# Patient Record
Sex: FEMALE | Race: WHITE | Employment: FULL TIME | URBAN - METROPOLITAN AREA
[De-identification: names, ages, dates, MRNs, and addresses within clinical notes are randomized per-mention and may not be internally consistent; named-entity substitution may affect disease eponyms.]

---

## 2019-04-09 LAB
ABO + RH BLD: NORMAL
ABO + RH BLD: NORMAL
BLD GP AB SCN SERPL QL: NORMAL
HBV SURFACE AG SERPL QL IA: NORMAL
HIV 1+2 AB+HIV1 P24 AG SERPL QL IA: NORMAL
RUBELLA ABY IGG: NORMAL
TREPONEMA ANTIBODIES: NORMAL

## 2019-10-23 LAB — GROUP B STREP PCR: POSITIVE

## 2019-11-10 ENCOUNTER — ANESTHESIA EVENT (OUTPATIENT)
Dept: OBGYN | Facility: CLINIC | Age: 39
End: 2019-11-10
Payer: COMMERCIAL

## 2019-11-10 ENCOUNTER — ANESTHESIA (OUTPATIENT)
Dept: OBGYN | Facility: CLINIC | Age: 39
End: 2019-11-10
Payer: COMMERCIAL

## 2019-11-10 ENCOUNTER — HOSPITAL ENCOUNTER (INPATIENT)
Facility: CLINIC | Age: 39
LOS: 2 days | Discharge: HOME | End: 2019-11-12
Attending: SPECIALIST | Admitting: SPECIALIST
Payer: COMMERCIAL

## 2019-11-10 PROBLEM — O99.820 GROUP B STREPTOCOCCAL CARRIAGE COMPLICATING PREGNANCY: Status: ACTIVE | Noted: 2019-11-10

## 2019-11-10 PROBLEM — E04.1 RIGHT THYROID NODULE: Status: ACTIVE | Noted: 2018-03-06

## 2019-11-10 PROBLEM — O09.529 AMA (ADVANCED MATERNAL AGE) MULTIGRAVIDA 35+, UNSPECIFIED TRIMESTER: Status: ACTIVE | Noted: 2019-11-10

## 2019-11-10 PROBLEM — F41.9 ANXIETY: Status: ACTIVE | Noted: 2017-02-07

## 2019-11-10 PROBLEM — Z36.89 ENCOUNTER FOR TRIAGE IN PREGNANT PATIENT: Status: ACTIVE | Noted: 2019-11-10

## 2019-11-10 PROBLEM — B00.9 RECURRENT HSV (HERPES SIMPLEX VIRUS): Status: ACTIVE | Noted: 2017-02-07

## 2019-11-10 LAB
ABO + RH BLD: NORMAL
ABO + RH BLD: NORMAL
BASOPHILS # BLD AUTO: 0 10E9/L (ref 0–0.2)
BASOPHILS NFR BLD AUTO: 0.2 %
BLD GP AB SCN SERPL QL: NORMAL
BLOOD BANK CMNT PATIENT-IMP: NORMAL
DIFFERENTIAL METHOD BLD: ABNORMAL
EOSINOPHIL # BLD AUTO: 0.1 10E9/L (ref 0–0.7)
EOSINOPHIL NFR BLD AUTO: 0.7 %
ERYTHROCYTE [DISTWIDTH] IN BLOOD BY AUTOMATED COUNT: 13.1 % (ref 10–15)
HCT VFR BLD AUTO: 37.4 % (ref 35–47)
HGB BLD-MCNC: 13.4 G/DL (ref 11.7–15.7)
IMM GRANULOCYTES # BLD: 0.2 10E9/L (ref 0–0.4)
IMM GRANULOCYTES NFR BLD: 1.3 %
LYMPHOCYTES # BLD AUTO: 2.4 10E9/L (ref 0.8–5.3)
LYMPHOCYTES NFR BLD AUTO: 18 %
MCH RBC QN AUTO: 32.2 PG (ref 26.5–33)
MCHC RBC AUTO-ENTMCNC: 35.8 G/DL (ref 31.5–36.5)
MCV RBC AUTO: 90 FL (ref 78–100)
MONOCYTES # BLD AUTO: 1 10E9/L (ref 0–1.3)
MONOCYTES NFR BLD AUTO: 7.8 %
NEUTROPHILS # BLD AUTO: 9.6 10E9/L (ref 1.6–8.3)
NEUTROPHILS NFR BLD AUTO: 72 %
NRBC # BLD AUTO: 0 10*3/UL
NRBC BLD AUTO-RTO: 0 /100
PLATELET # BLD AUTO: 232 10E9/L (ref 150–450)
RBC # BLD AUTO: 4.16 10E12/L (ref 3.8–5.2)
SPECIMEN EXP DATE BLD: NORMAL
WBC # BLD AUTO: 13.4 10E9/L (ref 4–11)

## 2019-11-10 PROCEDURE — 85025 COMPLETE CBC W/AUTO DIFF WBC: CPT | Performed by: SPECIALIST

## 2019-11-10 PROCEDURE — 36415 COLL VENOUS BLD VENIPUNCTURE: CPT | Performed by: SPECIALIST

## 2019-11-10 PROCEDURE — 12000000 ZZH R&B MED SURG/OB

## 2019-11-10 PROCEDURE — 86780 TREPONEMA PALLIDUM: CPT | Performed by: SPECIALIST

## 2019-11-10 PROCEDURE — 25000128 H RX IP 250 OP 636: Performed by: SPECIALIST

## 2019-11-10 PROCEDURE — 59025 FETAL NON-STRESS TEST: CPT

## 2019-11-10 PROCEDURE — 25000125 ZZHC RX 250: Performed by: ANESTHESIOLOGY

## 2019-11-10 PROCEDURE — 86900 BLOOD TYPING SEROLOGIC ABO: CPT | Performed by: SPECIALIST

## 2019-11-10 PROCEDURE — 25000128 H RX IP 250 OP 636: Performed by: ANESTHESIOLOGY

## 2019-11-10 PROCEDURE — 25800030 ZZH RX IP 258 OP 636: Performed by: SPECIALIST

## 2019-11-10 PROCEDURE — G0463 HOSPITAL OUTPT CLINIC VISIT: HCPCS | Mod: 25

## 2019-11-10 PROCEDURE — 86901 BLOOD TYPING SEROLOGIC RH(D): CPT | Performed by: SPECIALIST

## 2019-11-10 PROCEDURE — 37000011 ZZH ANESTHESIA WARD SERVICE

## 2019-11-10 PROCEDURE — 86850 RBC ANTIBODY SCREEN: CPT | Performed by: SPECIALIST

## 2019-11-10 PROCEDURE — 25800030 ZZH RX IP 258 OP 636: Performed by: ANESTHESIOLOGY

## 2019-11-10 RX ORDER — FENTANYL/BUPIVACAINE/NS/PF 2-1250MCG
14 PLASTIC BAG, INJECTION (ML) INJECTION CONTINUOUS
Status: DISCONTINUED | OUTPATIENT
Start: 2019-11-10 | End: 2019-11-11

## 2019-11-10 RX ORDER — CARBOPROST TROMETHAMINE 250 UG/ML
250 INJECTION, SOLUTION INTRAMUSCULAR
Status: DISCONTINUED | OUTPATIENT
Start: 2019-11-10 | End: 2019-11-12 | Stop reason: HOSPADM

## 2019-11-10 RX ORDER — OXYTOCIN 10 [USP'U]/ML
10 INJECTION, SOLUTION INTRAMUSCULAR; INTRAVENOUS
Status: DISCONTINUED | OUTPATIENT
Start: 2019-11-10 | End: 2019-11-11

## 2019-11-10 RX ORDER — VALACYCLOVIR HYDROCHLORIDE 500 MG/1
1000 TABLET, FILM COATED ORAL DAILY
Status: ON HOLD | COMMUNITY
End: 2019-11-12

## 2019-11-10 RX ORDER — NALOXONE HYDROCHLORIDE 0.4 MG/ML
.1-.4 INJECTION, SOLUTION INTRAMUSCULAR; INTRAVENOUS; SUBCUTANEOUS
Status: DISCONTINUED | OUTPATIENT
Start: 2019-11-10 | End: 2019-11-11

## 2019-11-10 RX ORDER — FENTANYL CITRATE 50 UG/ML
50-100 INJECTION, SOLUTION INTRAMUSCULAR; INTRAVENOUS ONCE
Status: COMPLETED | OUTPATIENT
Start: 2019-11-10 | End: 2019-11-10

## 2019-11-10 RX ORDER — FENTANYL CITRATE 50 UG/ML
50-100 INJECTION, SOLUTION INTRAMUSCULAR; INTRAVENOUS
Status: DISCONTINUED | OUTPATIENT
Start: 2019-11-10 | End: 2019-11-10

## 2019-11-10 RX ORDER — NALBUPHINE HYDROCHLORIDE 10 MG/ML
2.5-5 INJECTION, SOLUTION INTRAMUSCULAR; INTRAVENOUS; SUBCUTANEOUS EVERY 6 HOURS PRN
Status: DISCONTINUED | OUTPATIENT
Start: 2019-11-10 | End: 2019-11-11

## 2019-11-10 RX ORDER — PENICILLIN G POTASSIUM 5000000 [IU]/1
5 INJECTION, POWDER, FOR SOLUTION INTRAMUSCULAR; INTRAVENOUS ONCE
Status: COMPLETED | OUTPATIENT
Start: 2019-11-10 | End: 2019-11-10

## 2019-11-10 RX ORDER — DOCUSATE SODIUM 100 MG/1
100 CAPSULE, LIQUID FILLED ORAL 2 TIMES DAILY
COMMUNITY

## 2019-11-10 RX ORDER — EPHEDRINE SULFATE 50 MG/ML
5 INJECTION, SOLUTION INTRAMUSCULAR; INTRAVENOUS; SUBCUTANEOUS
Status: DISCONTINUED | OUTPATIENT
Start: 2019-11-10 | End: 2019-11-11

## 2019-11-10 RX ORDER — ONDANSETRON 2 MG/ML
4 INJECTION INTRAMUSCULAR; INTRAVENOUS EVERY 6 HOURS PRN
Status: DISCONTINUED | OUTPATIENT
Start: 2019-11-10 | End: 2019-11-11

## 2019-11-10 RX ORDER — SODIUM CHLORIDE, SODIUM LACTATE, POTASSIUM CHLORIDE, CALCIUM CHLORIDE 600; 310; 30; 20 MG/100ML; MG/100ML; MG/100ML; MG/100ML
INJECTION, SOLUTION INTRAVENOUS CONTINUOUS
Status: DISCONTINUED | OUTPATIENT
Start: 2019-11-10 | End: 2019-11-11

## 2019-11-10 RX ORDER — OXYCODONE AND ACETAMINOPHEN 5; 325 MG/1; MG/1
1 TABLET ORAL
Status: DISCONTINUED | OUTPATIENT
Start: 2019-11-10 | End: 2019-11-12 | Stop reason: HOSPADM

## 2019-11-10 RX ORDER — METHYLERGONOVINE MALEATE 0.2 MG/ML
200 INJECTION INTRAVENOUS
Status: DISCONTINUED | OUTPATIENT
Start: 2019-11-10 | End: 2019-11-11

## 2019-11-10 RX ORDER — OXYTOCIN/0.9 % SODIUM CHLORIDE 30/500 ML
100-340 PLASTIC BAG, INJECTION (ML) INTRAVENOUS CONTINUOUS PRN
Status: COMPLETED | OUTPATIENT
Start: 2019-11-10 | End: 2019-11-11

## 2019-11-10 RX ORDER — IBUPROFEN 400 MG/1
800 TABLET, FILM COATED ORAL
Status: DISCONTINUED | OUTPATIENT
Start: 2019-11-10 | End: 2019-11-11

## 2019-11-10 RX ORDER — LIDOCAINE HYDROCHLORIDE AND EPINEPHRINE 15; 5 MG/ML; UG/ML
INJECTION, SOLUTION EPIDURAL PRN
Status: DISCONTINUED | OUTPATIENT
Start: 2019-11-10 | End: 2019-11-11

## 2019-11-10 RX ORDER — ACETAMINOPHEN 325 MG/1
650 TABLET ORAL EVERY 4 HOURS PRN
Status: DISCONTINUED | OUTPATIENT
Start: 2019-11-10 | End: 2019-11-11

## 2019-11-10 RX ORDER — POLYETHYLENE GLYCOL 3350 17 G/17G
1 POWDER, FOR SOLUTION ORAL DAILY
COMMUNITY

## 2019-11-10 RX ADMIN — LIDOCAINE HYDROCHLORIDE AND EPINEPHRINE 2 ML: 15; 5 INJECTION, SOLUTION EPIDURAL at 20:32

## 2019-11-10 RX ADMIN — SODIUM CHLORIDE, POTASSIUM CHLORIDE, SODIUM LACTATE AND CALCIUM CHLORIDE: 600; 310; 30; 20 INJECTION, SOLUTION INTRAVENOUS at 18:52

## 2019-11-10 RX ADMIN — SODIUM CHLORIDE 2.5 MILLION UNITS: 9 INJECTION, SOLUTION INTRAVENOUS at 22:31

## 2019-11-10 RX ADMIN — SODIUM CHLORIDE, POTASSIUM CHLORIDE, SODIUM LACTATE AND CALCIUM CHLORIDE: 600; 310; 30; 20 INJECTION, SOLUTION INTRAVENOUS at 21:28

## 2019-11-10 RX ADMIN — LIDOCAINE HYDROCHLORIDE AND EPINEPHRINE 3 ML: 15; 5 INJECTION, SOLUTION EPIDURAL at 20:31

## 2019-11-10 RX ADMIN — BUPIVACAINE HYDROCHLORIDE 12 ML/HR: 5 INJECTION, SOLUTION EPIDURAL; INTRACAUDAL at 20:36

## 2019-11-10 RX ADMIN — PENICILLIN G POTASSIUM 5 MILLION UNITS: 5000000 POWDER, FOR SOLUTION INTRAMUSCULAR; INTRAPLEURAL; INTRATHECAL; INTRAVENOUS at 18:52

## 2019-11-10 RX ADMIN — Medication 5 MG: at 20:19

## 2019-11-10 RX ADMIN — FENTANYL CITRATE 100 MCG: 50 INJECTION, SOLUTION INTRAMUSCULAR; INTRAVENOUS at 20:14

## 2019-11-10 ASSESSMENT — MIFFLIN-ST. JEOR: SCORE: 1727.76

## 2019-11-11 LAB — T PALLIDUM AB SER QL: NONREACTIVE

## 2019-11-11 PROCEDURE — 3E0R3BZ INTRODUCTION OF ANESTHETIC AGENT INTO SPINAL CANAL, PERCUTANEOUS APPROACH: ICD-10-PCS | Performed by: SPECIALIST

## 2019-11-11 PROCEDURE — 25000132 ZZH RX MED GY IP 250 OP 250 PS 637: Performed by: SPECIALIST

## 2019-11-11 PROCEDURE — 12000035 ZZH R&B POSTPARTUM

## 2019-11-11 PROCEDURE — 25000125 ZZHC RX 250: Performed by: SPECIALIST

## 2019-11-11 PROCEDURE — 00HU33Z INSERTION OF INFUSION DEVICE INTO SPINAL CANAL, PERCUTANEOUS APPROACH: ICD-10-PCS | Performed by: SPECIALIST

## 2019-11-11 PROCEDURE — 72200001 ZZH LABOR CARE VAGINAL DELIVERY SINGLE

## 2019-11-11 PROCEDURE — 10907ZC DRAINAGE OF AMNIOTIC FLUID, THERAPEUTIC FROM PRODUCTS OF CONCEPTION, VIA NATURAL OR ARTIFICIAL OPENING: ICD-10-PCS | Performed by: SPECIALIST

## 2019-11-11 RX ORDER — BISACODYL 10 MG
10 SUPPOSITORY, RECTAL RECTAL DAILY PRN
Status: DISCONTINUED | OUTPATIENT
Start: 2019-11-13 | End: 2019-11-12 | Stop reason: HOSPADM

## 2019-11-11 RX ORDER — HYDROCORTISONE 2.5 %
CREAM (GRAM) TOPICAL 3 TIMES DAILY PRN
Status: DISCONTINUED | OUTPATIENT
Start: 2019-11-11 | End: 2019-11-12 | Stop reason: HOSPADM

## 2019-11-11 RX ORDER — OXYTOCIN/0.9 % SODIUM CHLORIDE 30/500 ML
340 PLASTIC BAG, INJECTION (ML) INTRAVENOUS CONTINUOUS PRN
Status: DISCONTINUED | OUTPATIENT
Start: 2019-11-11 | End: 2019-11-12 | Stop reason: HOSPADM

## 2019-11-11 RX ORDER — AMOXICILLIN 250 MG
2 CAPSULE ORAL 2 TIMES DAILY
Status: DISCONTINUED | OUTPATIENT
Start: 2019-11-11 | End: 2019-11-12 | Stop reason: HOSPADM

## 2019-11-11 RX ORDER — IBUPROFEN 600 MG/1
600 TABLET, FILM COATED ORAL EVERY 6 HOURS PRN
Status: DISCONTINUED | OUTPATIENT
Start: 2019-11-11 | End: 2019-11-12 | Stop reason: HOSPADM

## 2019-11-11 RX ORDER — NALOXONE HYDROCHLORIDE 0.4 MG/ML
.1-.4 INJECTION, SOLUTION INTRAMUSCULAR; INTRAVENOUS; SUBCUTANEOUS
Status: DISCONTINUED | OUTPATIENT
Start: 2019-11-11 | End: 2019-11-12 | Stop reason: HOSPADM

## 2019-11-11 RX ORDER — ACETAMINOPHEN 325 MG/1
650 TABLET ORAL EVERY 4 HOURS PRN
Status: DISCONTINUED | OUTPATIENT
Start: 2019-11-11 | End: 2019-11-12 | Stop reason: HOSPADM

## 2019-11-11 RX ORDER — OXYTOCIN 10 [USP'U]/ML
10 INJECTION, SOLUTION INTRAMUSCULAR; INTRAVENOUS
Status: DISCONTINUED | OUTPATIENT
Start: 2019-11-11 | End: 2019-11-12 | Stop reason: HOSPADM

## 2019-11-11 RX ORDER — MISOPROSTOL 200 UG/1
800 TABLET ORAL
Status: DISCONTINUED | OUTPATIENT
Start: 2019-11-11 | End: 2019-11-12 | Stop reason: HOSPADM

## 2019-11-11 RX ORDER — LANOLIN 100 %
OINTMENT (GRAM) TOPICAL
Status: DISCONTINUED | OUTPATIENT
Start: 2019-11-11 | End: 2019-11-12 | Stop reason: HOSPADM

## 2019-11-11 RX ORDER — OXYTOCIN/0.9 % SODIUM CHLORIDE 30/500 ML
100 PLASTIC BAG, INJECTION (ML) INTRAVENOUS CONTINUOUS
Status: DISCONTINUED | OUTPATIENT
Start: 2019-11-11 | End: 2019-11-12 | Stop reason: HOSPADM

## 2019-11-11 RX ORDER — AMOXICILLIN 250 MG
1 CAPSULE ORAL 2 TIMES DAILY
Status: DISCONTINUED | OUTPATIENT
Start: 2019-11-11 | End: 2019-11-12 | Stop reason: HOSPADM

## 2019-11-11 RX ADMIN — Medication 100 ML/HR: at 02:09

## 2019-11-11 RX ADMIN — IBUPROFEN 600 MG: 600 TABLET ORAL at 14:30

## 2019-11-11 RX ADMIN — ACETAMINOPHEN 650 MG: 325 TABLET, FILM COATED ORAL at 14:30

## 2019-11-11 RX ADMIN — IBUPROFEN 600 MG: 600 TABLET ORAL at 08:25

## 2019-11-11 RX ADMIN — SENNOSIDES AND DOCUSATE SODIUM 1 TABLET: 8.6; 5 TABLET ORAL at 20:30

## 2019-11-11 RX ADMIN — Medication 340 ML/HR: at 01:37

## 2019-11-11 RX ADMIN — ACETAMINOPHEN 650 MG: 325 TABLET, FILM COATED ORAL at 20:30

## 2019-11-11 RX ADMIN — IBUPROFEN 600 MG: 600 TABLET ORAL at 20:30

## 2019-11-11 RX ADMIN — SENNOSIDES AND DOCUSATE SODIUM 1 TABLET: 8.6; 5 TABLET ORAL at 08:05

## 2019-11-11 NOTE — PLAN OF CARE
Patient arrived to room 422 at 0400 via wheel chair with baby in arms. Report received from Shari Ramirez RN. ID bands verified with RN. Patient and family oriented to room. Safety education reviwed and demonstrated. Call light within reach.

## 2019-11-11 NOTE — PLAN OF CARE
Vital signs stable. Fundus firm at Patient voiding without difficulty. Able to ambulate in room free of dizziness. Tylenol/ibuprofen offered for pain management but declined. Breastfeeding infant every 2-3 hours, latch verified. Encouraged to call with questions/concerns. Will continue to monitor.

## 2019-11-11 NOTE — PROGRESS NOTES
1915: Report from Michelle Rn and care assumed. At bedside with Dr. Baker to get H&P and discuss labor plan. Plan to give pt something for anxiety and get epidural.  1945: Anesthesia  paged to come and discuss with patient before any meds administered. Pt up and moving in room so loss of capture or maternal heart rate traced. RN at bedside since 1915 readjusting and listening.   1951: Dr. Mcmillan at bedside Epidural discussed and pt consented to placement. Pt very anxious about needles and whole epidural process. Plan to administer  mcgs during epidural placement per Dr. Mcmillan assessment of patient anxiety at bedside.  2005: Pt sat up for epidural. Loss of fetal heart rate tracing while pt sitting for epidural monitor adjusted intermittently and fetal heart tones audible in 150's. Total of 100 mcgs of fentanyl given and one dose of ephedrine given per verbal order of Dr. Mcmillan d/t pt feeling light headed after fentanyl administration.  2037: Pt laid back down after epidural placed. Vitals stale and Fetal heart tones category 1.   2131: Dr. Baker updated on pt status.   2220: Dr. Baker at bedside to assess cervix of 6,100,-2 and the AROM clear fluid then second dose of PNC started  Pt continued to labor. Rotated from side to side lying with peanut ball.   2356: Straight cathed for 800ml  0000: 7-8 cm, 100, 0 Fetal head OP in pelvis and pt stating feeling some super pubic discomfort. Put pt in hands and knees position and with pt consent to lisa spinning babies move. Then pt moved to left side lying posterior extension. 0037: Anesthesia called d/t pt discomfort with contractions.   0042: Dose changed to 14 and bolus given by anesthesia. And position changed to semi fowlers with a right tilt and feet of bed lowered.   0100: placed pt in frog legs position with peanut ball in place. 0115: Pt cervix assessed d/t pt feeling very uncomfortable. Pt complete plus 2. Practice push with next contraction moved baby  well. Stopped pushing immediately. Dr. Baker called to attend delivery.  0120: Olivia TALBOT at bedside.   0129: Started pushing  0133: Viable male infant born at 0133 and brought skin to skin with mother. Nuchal cord and true knot present. Delayed cord clamping preformed for 2 minutes.   0345:l Pt up to bathroom and voided.    Data: Katlyn Valle transferred to [facility] via wheelchair at 0355. Baby transferred via parent's arms.  Action: Receiving unit notified of transfer: Yes. Patient and family notified of room change. Report given to Yelena ADKINS RN at 0405. Belongings sent to receiving unit. Accompanied by Registered Nurse. Oriented patient to surroundings. Call light within reach. ID bands double-checked with receiving RN.  Response: Patient tolerated transfer and is stable.

## 2019-11-11 NOTE — PROGRESS NOTES
"PP Day 0    Pt. without c/o.  Nursing well.  Minimal bleeding.    /67   Temp 98.2  F (36.8  C) (Oral)   Resp 16   Ht 1.702 m (5' 7\")   Wt 102.5 kg (226 lb)   SpO2 96%   Breastfeeding? Unknown   BMI 35.40 kg/m   Temp (24hrs), Av.2  F (36.8  C), Min:97.3  F (36.3  C), Max:99.3  F (37.4  C)     Fundus firm and nontender.    Doing well. Anticipate discontinue tomorrow.  Electronically signed by:  Jaime Pacheco MD, MD   2019 7:27 AM  [facility]      .      "
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
## 2019-11-11 NOTE — ANESTHESIA PROCEDURE NOTES
Peripheral nerve/Neuraxial procedure note : epidural catheter  Pre-Procedure  Performed by Julio C Mcmillan MD  Location: OB      Pre-Anesthestic Checklist: patient identified, IV checked, risks and benefits discussed, informed consent, pre-op evaluation and at physician/surgeon's request    Timeout  Correct Patient: Yes   Correct Procedure: Yes       Correct Position: Yes     .   Procedure Documentation    .    Procedure: epidural catheter, .   Patient Position:sitting Insertion Site:L3-4  (midline approach) Injection technique: LORT air   Local skin infiltrated with 3 mL of 1% lidocaine.  GINA at 5 cm    Patient Prep/Sterile Barriers; mask, sterile gloves, povidone-iodine 7.5% surgical scrub, patient draped.  .  Needle: ToNexPlanary needle   Needle Gauge: 17.    Needle Length (Inches) 3.5   # of attempts: 2 and # of redirects:  .    Catheter: 19 G . .  Catheter threaded easily  5 cm epidural space.  .   .    Assessment/Narrative  Paresthesias: No.  .  .  Aspiration negative for heme or CSF  . Test dose of 3 mL lidocaine 1.5% w/ 1:200,000 epinephrine at. Test dose negative for signs of intravascular, subdural or intrathecal injection. Comments:  Attempted L45, os.  L34 successful.  Test dose of 3cc negative, then additional 2cc of test dose given.    Adhesive spray, tegaderm, and tape to secure  Epidural bolused with 10cc of the pump mix  Of note, the patient had a lot of sweat towards the end of the procedure and it was impossible to tell its origin

## 2019-11-11 NOTE — PLAN OF CARE
Data: Patient presented to [facility] at 1747.   Reason for maternal/fetal assessment per patient is Rule Out Labor  .  Patient is a . Prenatal record reviewed.      OB History    Para Term  AB Living   3 2 2 0 0 2   SAB TAB Ectopic Multiple Live Births   0 0 0 0 2      # Outcome Date GA Lbr Bean/2nd Weight Sex Delivery Anes PTL Lv   3 Current            2 Term 02    F    SVEN   1 Term 98        SVEN   . Medical history:   Past Medical History:   Diagnosis Date     Anxiety      Depressive disorder      Thyroid disease     calcified cyst on thyroid   . Gestational Age 38w6d. VSS. Fetal movement present. Patient denies vaginal discharge, pelvic pressure, UTI symptoms, GI problems, bloody show, vaginal bleeding, edema, headache, visual disturbances, epigastric or URQ pain, rupture of membranes. Support persons are present.  Patient reports contractions occurring all day, but increasing in frequency and intensity this afternoon.  Action: Verbal consent for EFM. Triage assessment completed. EFM applied for NST. Uterine assessment completed. Fetal assessment: Presumed adequate fetal oxygenation documented (see flow record).   Response: Dr. Baker informed of patient's arrival, current complaints, medical and pregnancy history, admission assessment, contractions, FHT's, VS and SVE. Plan per provider is admit to L&D with pain medication prn, antibiotics for GBS. Patient verbalized agreement with plan. Patient transferred to room 215 ambulatory, oriented to room and call light. Report given to PRESTON Khan RN.

## 2019-11-11 NOTE — L&D DELIVERY NOTE
OB Vaginal Delivery Note    Katlyn Valle MRN# [id number]   Age: 39 year old YOB: 1980       GA: 39w0d  GP:   Labor Complications: None   EBL:    mL  Delivery QBL:  50 mls  Delivery Type: Vaginal, Spontaneous   ROM to Delivery Time: (Delivered) Hours: 3 Minutes: 13  [place] Weight:      1 Minute 5 Minute 10 Minute   Apgar Totals:    8    9             Delivery Details:  Katlyn Valle, a 39 year old  female admitted in active labor at 4cm dilatation.  The patient was known GBS positive and started on PCN.  She received an epidural and then was AROMd with clear fluid.  She progressed normally to C/C/+1 and was feeling pressure.  She pushed thru approximately 3 contractions, going on to deliver via  a viable, male infant.  A loose nuchal cord was noted and manually relieved.  No dystocia.  The baby was placed on the patient's chest and the cord doubly clamped and cut after an intentional delay.  A true knot was also noted in the cord  The placenta delivered spont & intact with 3VC shortly thereafter.   No lacerations were.  Sponge, lap, & needle counts correct X2.  Pt and baby were doing well at completion of case. QBL 50 mls.  Tracing was category 1.     Labor Event Times    Dilation complete date:  19 Complete time:   1:21 AM   Start pushing date/time:  2019 0129      Rupture date/time: 11/10/19 2220   Rupture type:  Artificial Rupture of Membranes  Fluid color:  Clear     Delivery/Placenta Date and Time    Delivery Date:  19 Delivery Time:   1:33 AM   Placenta Date/Time:  2019  1:40 AM  Oxytocin given at the time of delivery:  after delivery of baby     Cord    Vessels:  3 Vessels Complications:  Nuchal, Knot   Cord Blood Disposition:  Lab       Labor Events and Shoulder Dystocia    Fetal Tracing Prior to Delivery:  Category 1  Shoulder dystocia present?:  Neg             Delivery (Maternal) (Provider to Complete) (875528)    Episiotomy:   None  Perineal lacerations:  None       Blood Loss  Mother: Katlyn Valle #[id number]   Start of Mother's Information    IO Blood Loss  11/10/19 1333 - 11/11/19 0158    None           End of Mother's Information  Mother: Katlyn Valle #[id number]         Delivery - Provider to Complete ([id number])    Delivering clinician:  Elina Baker MD  Attempted Delivery Types (Choose all that apply):  Spontaneous Vaginal Delivery  Delivery Type (Choose the 1 that will go to the Birth History):  Vaginal, Spontaneous          Placenta    Delayed Cord Clamping:  Done  Date/Time:  11/11/2019  1:40 AM  Removal:  Spontaneous  Comments:  Loose nuchal cord; true knot also noted in cord  Disposition:  Hospital disposal     Presentation and Position    Presentation:  Vertex  Position:  Middle Occiput Anterior         Electronically signed by Elina Baker MD  Call [phone] to have me paged or find out who's wendy Gonzales OB/GYN  [facility]   November 11, 2019  2:02 AM

## 2019-11-11 NOTE — ANESTHESIA PREPROCEDURE EVALUATION
"Anesthesia Pre-Procedure Evaluation    Patient: Katlyn Valle   MRN: [id number] : 1980          Preoperative Diagnosis: * No pre-op diagnosis entered *    * No procedures listed *    Past Medical History:   Diagnosis Date     Anxiety      Depressive disorder      Genital herpes      Lumbar herniated disc      Needle phobia      Thyroid disease     calcified cyst on thyroid     Past Surgical History:   Procedure Laterality Date     NO HISTORY OF SURGERY         Anesthesia Evaluation     .             ROS/MED HX    ENT/Pulmonary:       Neurologic:       Cardiovascular:         METS/Exercise Tolerance:     Hematologic:         Musculoskeletal:   (+)  other musculoskeletal- lumbar disc herniation (pt says she has extra vertebral body (L6), and has herniation between L5-L6.  Has had intermittent sciatica, but much improved during pregnancy      GI/Hepatic:         Renal/Genitourinary:         Endo:         Psychiatric:         Infectious Disease:         Malignancy:         Other:                                 Lab Results   Component Value Date    WBC 13.4 (H) 11/10/2019    HGB 13.4 11/10/2019    HCT 37.4 11/10/2019     11/10/2019       Preop Vitals  BP Readings from Last 3 Encounters:   11/10/19 124/57    Pulse Readings from Last 3 Encounters:   No data found for Pulse      Resp Readings from Last 3 Encounters:   11/10/19 16    SpO2 Readings from Last 3 Encounters:   No data found for SpO2      Temp Readings from Last 1 Encounters:   11/10/19 36.8  C (98.3  F) (Temporal)    Ht Readings from Last 1 Encounters:   11/10/19 1.702 m (5' 7\")      Wt Readings from Last 1 Encounters:   11/10/19 102.5 kg (226 lb)    Estimated body mass index is 35.4 kg/m  as calculated from the following:    Height as of this encounter: 1.702 m (5' 7\").    Weight as of this encounter: 102.5 kg (226 lb).       Anesthesia Plan      History & Physical Review      ASA Status:  2 .        Plan for Epidural     Pt very anxious.  " Discussed the epidural at length with the  her and her friend.  I answered all questions.  She did not want me to review risks of an epidural, as she said she had an understanding already.    Plan is to give her some IV fentanyl prior to the procedure to help her with pain and anxiety.  She has a needle phobia and this procedure worries her.      Postoperative Care      Consents                 Julio C Mcmillan MD

## 2019-11-12 VITALS
SYSTOLIC BLOOD PRESSURE: 113 MMHG | BODY MASS INDEX: 35.47 KG/M2 | DIASTOLIC BLOOD PRESSURE: 66 MMHG | WEIGHT: 226 LBS | RESPIRATION RATE: 16 BRPM | TEMPERATURE: 98 F | OXYGEN SATURATION: 96 % | HEIGHT: 67 IN

## 2019-11-12 PROCEDURE — 25000132 ZZH RX MED GY IP 250 OP 250 PS 637: Performed by: SPECIALIST

## 2019-11-12 PROCEDURE — 25000128 H RX IP 250 OP 636: Performed by: SPECIALIST

## 2019-11-12 PROCEDURE — 90686 IIV4 VACC NO PRSV 0.5 ML IM: CPT | Performed by: SPECIALIST

## 2019-11-12 RX ADMIN — IBUPROFEN 600 MG: 600 TABLET ORAL at 08:45

## 2019-11-12 RX ADMIN — ACETAMINOPHEN 650 MG: 325 TABLET, FILM COATED ORAL at 08:44

## 2019-11-12 RX ADMIN — INFLUENZA A VIRUS A/BRISBANE/02/2018 IVR-190 (H1N1) ANTIGEN (FORMALDEHYDE INACTIVATED), INFLUENZA A VIRUS A/KANSAS/14/2017 X-327 (H3N2) ANTIGEN (FORMALDEHYDE INACTIVATED), INFLUENZA B VIRUS B/PHUKET/3073/2013 ANTIGEN (FORMALDEHYDE INACTIVATED), AND INFLUENZA B VIRUS B/MARYLAND/15/2016 BX-69A ANTIGEN (FORMALDEHYDE INACTIVATED) 0.5 ML: 15; 15; 15; 15 INJECTION, SUSPENSION INTRAMUSCULAR at 11:02

## 2019-11-12 RX ADMIN — ACETAMINOPHEN 650 MG: 325 TABLET, FILM COATED ORAL at 02:21

## 2019-11-12 RX ADMIN — SENNOSIDES AND DOCUSATE SODIUM 1 TABLET: 8.6; 5 TABLET ORAL at 08:45

## 2019-11-12 RX ADMIN — IBUPROFEN 600 MG: 600 TABLET ORAL at 02:21

## 2019-11-12 NOTE — PLAN OF CARE
D: VSS, assessments WDL.   I: Pt. received complete discharge paperwork and home medications as filled by discharge pharmacy--none, medication are all otc. States she has 2 breast pumps at home already.  Pt. was given times of last dose for all discharge medications in writing on discharge medication sheets.  Discharge teaching included home medication, pain management, activity restrictions, postpartum cares, and signs and symptoms of infection.    A: Discharge outcomes on care plan met.  Mother states understanding and comfort with self and baby cares.  P: Pt. discharged to home.  Pt. was discharged with baby, and bands were checked at time of discharge.  Pt. was accompanied by , nurse and baby, and left with personal belongings.  Home care sent.  Pt. to follow up with OB per MD order.  Pt. had no further questions at the time of discharge and no unmet needs were identified.

## 2019-11-12 NOTE — PROGRESS NOTES
[facility] Obstetrics Post-Partum Progress Note          Assessment and Plan:    Assessment:   Post-partum day #1  Normal spontaneous vaginal delivery  L&D complications: None      Doing well.  No immediate surgical complications identified.  No excessive bleeding  Pain well-controlled.      Plan:   Breast feeding strategies discussed  Pain control measures as needed  Discharge later today           Interval History:   Doing well.  Pain is well-controlled.  No fevers.  .  Good appetite.  Denies chest pain, shortness of breath, nausea or vomiting.  Vaginal bleeding is similar to a heavy menstrual flow.  Ambulatory.  Breastfeeding well. Baby really good at [facility].          Significant Problems:      Patient Active Problem List   Diagnosis     Encounter for triage in pregnant patient     Indication for care in labor or delivery     Anxiety     Lumbago     Recurrent HSV (herpes simplex virus)     Right thyroid nodule     AMA (advanced maternal age) multigravida 35+, unspecified trimester     Group B streptococcal carriage complicating pregnancy      (spontaneous vaginal delivery)     Normal vaginal delivery             Review of Systems:    The patient denies any chest pain, shortness of breath, excessive pain, fever, chills, purulent drainage from the wound, nausea or vomiting.          Medications:   All medications related to the patient's surgery have been reviewed          Physical Exam:   All vitals stable  No data found.    Uterine fundus is firm, non-tender and at the level of the umbilicus          Data:     All laboratory data related to this surgery reviewed  Hemoglobin   Date Value Ref Range Status   11/10/2019 13.4 11.7 - 15.7 g/dL Final     No imaging studies have been ordered    MD Larissa Samson MD on 2019 at 8:06 AM

## 2019-11-12 NOTE — LACTATION NOTE
Routine and discharge visit.     Baby breast feeding well. Infant tolerates feeding well.  Reviewed breastfeeding positions, latch, lip placement, pinching of nipple, colostrum, milk coming in, mastitis, safe sleep, and safety of baby. Asking appropriate questions.  Reviewed breastfeeding section in A New Beginning patient education booklet. Discussed timing for initiating pumping.No further questions at this time. Reviewed to follow up with outpatient lactation consultant at pediatric clinic (if available) as needed. ?   Collette Burnette RN, Lactation Educator

## 2019-11-12 NOTE — PLAN OF CARE
VSS.  Pain well controlled, requesting prn pain medications as needed.  Up independently in room.  Working on breastfeeding  and  cares. On pathway. Continue to monitor and notify MD as needed.

## 2019-11-12 NOTE — DISCHARGE SUMMARY
[facility] Discharge Summary    Katlyn Valle MRN# [id number]   Age: 39 year old YOB: 1980     Date of Admission:  11/10/2019  Date of Discharge::  2019  Admitting Physician:  Elina Baker MD  Discharge Physician:  Larissa Anaya MD     Home clinic: [facility]          Admission Diagnoses:   pregnanacy  Encounter for triage in pregnant patient  Indication for care in labor or delivery   (spontaneous vaginal delivery)          Discharge Diagnosis:   Normal spontaneous vaginal delivery          Procedures:   Procedure(s): No additional procedures performed       No other procedures performed during this admission           Medications Prior to Admission:     Medications Prior to Admission   Medication Sig Dispense Refill Last Dose     docusate sodium (COLACE) 100 MG capsule Take 100 mg by mouth 2 times daily   Past Week at Unknown time     Magnesium Oxide 250 MG TABS    2019 at Unknown time     polyethylene glycol (MIRALAX/GLYCOLAX) packet Take 1 packet by mouth daily   11/10/2019 at Unknown time     Prenatal Vit-Fe Fumarate-FA (PRENATAL VITAMIN PO)    Past Week at Unknown time     [DISCONTINUED] valACYclovir (VALTREX) 500 MG tablet Take 1,000 mg by mouth daily                 Discharge Medications:     Current Discharge Medication List      CONTINUE these medications which have NOT CHANGED    Details   docusate sodium (COLACE) 100 MG capsule Take 100 mg by mouth 2 times daily      Magnesium Oxide 250 MG TABS       polyethylene glycol (MIRALAX/GLYCOLAX) packet Take 1 packet by mouth daily      Prenatal Vit-Fe Fumarate-FA (PRENATAL VITAMIN PO)          STOP taking these medications       valACYclovir (VALTREX) 500 MG tablet Comments:   Reason for Stopping:                     Consultations:   No consultations were requested during this admission          Brief History of Labor:   Presented in labor. Got epdiural and IV abx for gbs POS.  arom DONE.  Progressed to   with short second stage. Normal postpartum course            Hospital Course:   The patient's hospital course was unremarkable.  On discharge, her pain was well controlled. Vaginal bleeding is similar to peak menstrual flow.  Voiding without difficulty.  Ambulating well and tolerating a normal diet.  No fever.  Breastfeeding well.  Infant is stable.  She was discharged on post-partum day #1.    Post-partum hemoglobin:   Hemoglobin   Date Value Ref Range Status   11/10/2019 13.4 11.7 - 15.7 g/dL Final             Discharge Instructions and Follow-Up:   Discharge diet: Regular   Discharge activity: No sex for 6 week(s)   Discharge follow-up: Follow up with Dr. Sharma in 4-6 weeks   Wound care: Ice to area for comfort           Discharge Disposition:   Discharged to home      Attestation:  I have reviewed today's vital signs, notes, medications, labs and imaging.    Larissa Anaya MD on 11/12/2019 at 8:10 AM  Electronically signed by   Larissa Anaya MD  [facility]      Larissa Anaya MD

## 2019-11-12 NOTE — PLAN OF CARE
Vital signs stable. Postpartum assessment WDL. Pain controlled with tylenol, ibuprofen, and hot packs. Patient voiding without difficulty. Breastfeeding on cue with minimal assist. Patient and infant bonding well. Will continue with current plan of care. Thinking about discharging home today per MD approval.

## 2019-11-12 NOTE — ANESTHESIA POSTPROCEDURE EVALUATION
Patient: Katlyn Valle    * No procedures listed *    Diagnosis:* No pre-op diagnosis entered *  Diagnosis Additional Information: No value filed.    Anesthesia Type:  Epidural    Note:  Anesthesia Post Evaluation    Patient location during evaluation: Floor and Bedside  Patient participation: Able to fully participate in evaluation  Level of consciousness: awake and alert  Pain management: adequate  Airway patency: patent  Cardiovascular status: acceptable  Respiratory status: acceptable  Hydration status: acceptable  PONV: none             Last vitals:  Vitals:    11/11/19 0800 11/11/19 1206 11/11/19 1600   BP: 104/57 121/68 118/69   Resp: 16 16 16   Temp: 36.7  C (98  F) 36.4  C (97.6  F)    SpO2:            Electronically Signed By: Samuel Ward DO  November 11, 2019  7:36 PM

## 2021-08-15 ENCOUNTER — HEALTH MAINTENANCE LETTER (OUTPATIENT)
Age: 41
End: 2021-08-15

## 2021-10-10 ENCOUNTER — HEALTH MAINTENANCE LETTER (OUTPATIENT)
Age: 41
End: 2021-10-10

## 2022-09-18 ENCOUNTER — HEALTH MAINTENANCE LETTER (OUTPATIENT)
Age: 42
End: 2022-09-18

## 2023-10-08 ENCOUNTER — HEALTH MAINTENANCE LETTER (OUTPATIENT)
Age: 43
End: 2023-10-08